# Patient Record
Sex: MALE | URBAN - METROPOLITAN AREA
[De-identification: names, ages, dates, MRNs, and addresses within clinical notes are randomized per-mention and may not be internally consistent; named-entity substitution may affect disease eponyms.]

---

## 2023-01-16 ENCOUNTER — TRANSCRIBE ORDER (OUTPATIENT)
Dept: SCHEDULING | Age: 43
End: 2023-01-16

## 2023-01-16 DIAGNOSIS — I10 HIGH BLOOD PRESSURE: Primary | ICD-10-CM

## 2023-02-08 ENCOUNTER — HOSPITAL ENCOUNTER (OUTPATIENT)
Dept: NON INVASIVE DIAGNOSTICS | Age: 43
Discharge: HOME | End: 2023-02-08
Payer: COMMERCIAL

## 2023-02-08 DIAGNOSIS — I10 HIGH BLOOD PRESSURE: ICD-10-CM

## 2023-02-08 LAB
ABDOMINAL MID AORTA VEL: 102.4 CM/S
LEFT KIDNEY LENGTH: 10.25 CM
LEFT RENAL DIST DIAS: 43.8 CM/S
LEFT RENAL DIST RI: 0.51
LEFT RENAL DIST SYS: 88.8 CM/S
LEFT RENAL LOWER PARENCHYMA MAX: 47.6 CM/S
LEFT RENAL LOWER PARENCHYMA MIN: 20.9 CM/S
LEFT RENAL LOWER PARENCHYMA RI: 0.56
LEFT RENAL MID DIAS: 46.9 CM/S
LEFT RENAL MID RI: 0.6
LEFT RENAL MID SYS: 118 CM/S
LEFT RENAL MIDDLE PARENCHYMA MAX: 50.8 CM/S
LEFT RENAL MIDDLE PARENCHYMA MIN: 22.5 CM/S
LEFT RENAL MIDDLE PARENCHYMA RI: 0.56
LEFT RENAL ORIGIN DIAS: 51.8 CM/S
LEFT RENAL ORIGIN RI: 0.6
LEFT RENAL ORIGIN SYS: 130 CM/S
LEFT RENAL PROX DIAS: 45 CM/S
LEFT RENAL PROX RI: 0.64
LEFT RENAL PROX SYS: 126 CM/S
LEFT RENAL UPPER PARENCHYMA MAX: 26 CM/S
LEFT RENAL UPPER PARENCHYMA MIN: 13 CM/S
LEFT RENAL UPPER PARENCHYMA RI: 0.5
MID AORTIC AP: 1.72 CM
MID AORTIC TRANS: 1.85 CM
RIGHT KIDNEY LENGTH: 9.85 CM
RIGHT RENAL DIST DIAS: 41.7 CM/S
RIGHT RENAL DIST RI: 0.67
RIGHT RENAL DIST SYS: 128 CM/S
RIGHT RENAL LOWER PARENCHYMA MAX: 39 CM/S
RIGHT RENAL LOWER PARENCHYMA MIN: 15.5 CM/S
RIGHT RENAL LOWER PARENCHYMA RI: 0.6
RIGHT RENAL MID DIAS: 50.3 CM/S
RIGHT RENAL MID RI: 0.52
RIGHT RENAL MID SYS: 105 CM/S
RIGHT RENAL MIDDLE PARENCHYMA MAX: 50.3 CM/S
RIGHT RENAL MIDDLE PARENCHYMA MIN: 18.2 CM/S
RIGHT RENAL MIDDLE PARENCHYMA RI: 0.64
RIGHT RENAL ORIGIN DIAS: 31.9 CM/S
RIGHT RENAL ORIGIN RI: 0.59
RIGHT RENAL ORIGIN SYS: 78.5 CM/S
RIGHT RENAL PROX DIAS: 37 CM/S
RIGHT RENAL PROX RI: 0.39
RIGHT RENAL PROX SYS: 61.1 CM/S
RIGHT RENAL UPPER PARENCHYMA MAX: 36.2 CM/S
RIGHT RENAL UPPER PARENCHYMA MIN: 12.7 CM/S
RIGHT RENAL UPPER PARENCHYMA RI: 0.65

## 2023-02-08 PROCEDURE — 93975 VASCULAR STUDY: CPT

## 2023-05-22 ENCOUNTER — OFFICE VISIT (OUTPATIENT)
Age: 43
End: 2023-05-22
Payer: COMMERCIAL

## 2023-05-22 VITALS
RESPIRATION RATE: 16 BRPM | DIASTOLIC BLOOD PRESSURE: 80 MMHG | WEIGHT: 217 LBS | OXYGEN SATURATION: 99 % | BODY MASS INDEX: 29.39 KG/M2 | HEIGHT: 72 IN | HEART RATE: 65 BPM | SYSTOLIC BLOOD PRESSURE: 125 MMHG

## 2023-05-22 DIAGNOSIS — R60.0 SWELLING OF LEFT PAROTID GLAND: Primary | ICD-10-CM

## 2023-05-22 PROCEDURE — 99203 OFFICE O/P NEW LOW 30 MIN: CPT | Performed by: OTOLARYNGOLOGY

## 2023-05-22 RX ORDER — OMEPRAZOLE 20 MG/1
20 CAPSULE, DELAYED RELEASE ORAL DAILY
COMMUNITY

## 2023-05-22 RX ORDER — CHLORTHALIDONE 50 MG/1
50 TABLET ORAL DAILY
COMMUNITY

## 2023-05-22 RX ORDER — ALBUTEROL SULFATE 0.63 MG/3ML
1 SOLUTION RESPIRATORY (INHALATION) EVERY 6 HOURS PRN
COMMUNITY

## 2023-05-22 RX ORDER — ASPIRIN 81 MG/1
81 TABLET, CHEWABLE ORAL DAILY
COMMUNITY

## 2023-05-22 RX ORDER — AMLODIPINE BESYLATE 10 MG/1
10 TABLET ORAL DAILY
COMMUNITY

## 2023-05-22 RX ORDER — LOSARTAN POTASSIUM 100 MG/1
100 TABLET ORAL DAILY
COMMUNITY

## 2023-05-22 RX ORDER — SPIRONOLACTONE 50 MG/1
50 TABLET, FILM COATED ORAL DAILY
COMMUNITY

## 2023-05-22 RX ORDER — CELECOXIB 100 MG/1
100 CAPSULE ORAL 2 TIMES DAILY
COMMUNITY

## 2023-05-22 NOTE — PROGRESS NOTES
Otolaryngology-Head and Neck Surgery  New Patient Visit     Patient: Raven Moreno  YOB: 1980  MRN: [id number]  Date of Service: 5/22/2023    Chief Complaint:   Chief Complaint   Patient presents with    Other     Swollen glands       History of Present Illness: Raven Moreno is a 40 y.o. male who presents today for discussion of parotid swelling    Seen by dentist / oral surgeon for left facial swelling  Thought to be related to teeth but oral exam clear    Noted to have persistent parotid swelling over some months, and referred to ENT    Denies pain  Denies fluctation in size  No purulence in mouth or change in taste    No prior facial / neck surgeries     Past Medical History:  No past medical history on file. Past Surgical History:   No past surgical history on file. Medications:   Current Outpatient Medications   Medication Instructions    albuterol (ACCUNEB) 0.63 MG/3ML nebulizer solution 1 ampule, Nebulization, EVERY 6 HOURS PRN    amLODIPine (NORVASC) 10 mg, Oral, DAILY    aspirin 81 mg, Oral, DAILY    celecoxib (CELEBREX) 100 mg, Oral, 2 TIMES DAILY    chlorthalidone (HYGROTEN) 50 mg, Oral, DAILY    losartan (COZAAR) 100 mg, Oral, DAILY    omeprazole (PRILOSEC) 20 mg, Oral, DAILY    spironolactone (ALDACTONE) 50 mg, Oral, DAILY       Allergies: Allergies   Allergen Reactions    Shellfish Allergy Other (See Comments)       Social History:       Inmate     Family History:  No family history on file. Review of Systems:    Consitutional: denies fever, excessive weight gain or loss. Eyes: denies diplopia, eye pain. Integumentary: denies new concerning skin lesions. Ears, Nose, Mouth, Throat: denies except as per HPI.   Endocrine: denies hot or cold intolerance, increased thirst.  Respiratory: denies cough, hemoptysis, wheezing  Gastrointestinal: denies trouble swallowing, nausea, emesis, regurgitation  Musculoskeletal: denies muscle weakness or wasting  Cardiovascular: denies chest

## 2023-05-22 NOTE — PROGRESS NOTES
Chief Complaint   Patient presents with    Other     Swollen glands     /80   Pulse 65   Resp 16   Ht 6' (1.829 m)   Wt 217 lb (98.4 kg)   SpO2 99%   BMI 29.43 kg/m²   1. Have you been to the ER, urgent care clinic since your last visit? Hospitalized since your last visit? No    2. Have you seen or consulted any other health care providers outside of the [address] [place] [address] since your last visit? Include any pap smears or colon screening.  No

## 2023-06-07 ENCOUNTER — HOSPITAL ENCOUNTER (OUTPATIENT)
Facility: HOSPITAL | Age: 43
Discharge: HOME | End: 2023-06-10
Attending: OTOLARYNGOLOGY
Payer: COMMERCIAL

## 2023-06-07 DIAGNOSIS — R60.0 SWELLING OF LEFT PAROTID GLAND: ICD-10-CM

## 2023-06-07 PROCEDURE — 6360000004 HC RX CONTRAST MEDICATION: Performed by: OTOLARYNGOLOGY

## 2023-06-07 PROCEDURE — 70491 CT SOFT TISSUE NECK W/DYE: CPT

## 2023-06-07 RX ADMIN — IOPAMIDOL 100 ML: 755 INJECTION, SOLUTION INTRAVENOUS at 09:49

## 2023-06-27 ENCOUNTER — OFFICE VISIT (OUTPATIENT)
Age: 43
End: 2023-06-27
Payer: COMMERCIAL

## 2023-06-27 VITALS
RESPIRATION RATE: 16 BRPM | OXYGEN SATURATION: 98 % | HEART RATE: 56 BPM | BODY MASS INDEX: 29.39 KG/M2 | SYSTOLIC BLOOD PRESSURE: 130 MMHG | DIASTOLIC BLOOD PRESSURE: 80 MMHG | HEIGHT: 72 IN | WEIGHT: 217 LBS

## 2023-06-27 DIAGNOSIS — R60.0 SWELLING OF LEFT PAROTID GLAND: Primary | ICD-10-CM

## 2023-06-27 PROCEDURE — 99212 OFFICE O/P EST SF 10 MIN: CPT | Performed by: OTOLARYNGOLOGY
